# Patient Record
Sex: FEMALE
[De-identification: names, ages, dates, MRNs, and addresses within clinical notes are randomized per-mention and may not be internally consistent; named-entity substitution may affect disease eponyms.]

---

## 2021-06-29 PROBLEM — Z00.00 ENCOUNTER FOR PREVENTIVE HEALTH EXAMINATION: Status: ACTIVE | Noted: 2021-06-29

## 2021-07-06 ENCOUNTER — NON-APPOINTMENT (OUTPATIENT)
Age: 26
End: 2021-07-06

## 2021-07-06 ENCOUNTER — APPOINTMENT (OUTPATIENT)
Dept: HEART AND VASCULAR | Facility: CLINIC | Age: 26
End: 2021-07-06
Payer: MEDICAID

## 2021-07-06 VITALS
HEIGHT: 66 IN | RESPIRATION RATE: 15 BRPM | BODY MASS INDEX: 32.14 KG/M2 | DIASTOLIC BLOOD PRESSURE: 70 MMHG | WEIGHT: 200 LBS | SYSTOLIC BLOOD PRESSURE: 120 MMHG | HEART RATE: 90 BPM

## 2021-07-06 DIAGNOSIS — Z86.19 PERSONAL HISTORY OF OTHER INFECTIOUS AND PARASITIC DISEASES: ICD-10-CM

## 2021-07-06 PROCEDURE — 99214 OFFICE O/P EST MOD 30 MIN: CPT

## 2021-07-06 PROCEDURE — 93000 ELECTROCARDIOGRAM COMPLETE: CPT

## 2021-07-06 NOTE — DISCUSSION/SUMMARY
[With Me] : with me [___ Month(s)] : in [unfilled] month(s) [FreeTextEntry1] : 1. Myocarditis: EKG reviewed normal sinus rhythm nonspecific ST-T wave abnormalities. Review of hospital chart patient with a CRP of 4.68 Will repeat CRP, reviewed cardiac MRI which did not show any myocardial involvement advised that she can take NSAIDs p.r.n. continue colchicine and metoprolol for now. EKG reviewed.

## 2021-07-06 NOTE — HISTORY OF PRESENT ILLNESS
[FreeTextEntry1] : 26-year-old female with no significant past medical history comes in for followup. Patient was hospitalized on June 21 with chest pain ruled in with troponin max of 1.26 echo reviewed showed normal LV function mild valvular heart disease. Elevated CRP consistent with mild pericarditis. However, patient underwent a cardiac MRI with gadolinium which did not show any myocardial inflammation. Patient was discharged on colchicine. Subsequently she was diagnosed with C. difficile and is currently on p.o. Vanco. Overall, she feels better however does have episodes of chest discomfort and palpitations that come and go no exacerbating or relieving factors. CRP was 4.68

## 2021-07-07 LAB — CRP SERPL-MCNC: 5 MG/L

## 2021-07-15 RX ORDER — METOPROLOL SUCCINATE 25 MG/1
25 TABLET, EXTENDED RELEASE ORAL DAILY
Qty: 90 | Refills: 3 | Status: ACTIVE | COMMUNITY
Start: 1900-01-01 | End: 1900-01-01

## 2021-08-19 ENCOUNTER — APPOINTMENT (OUTPATIENT)
Dept: HEART AND VASCULAR | Facility: CLINIC | Age: 26
End: 2021-08-19
Payer: COMMERCIAL

## 2021-08-19 PROCEDURE — 99213 OFFICE O/P EST LOW 20 MIN: CPT | Mod: 95

## 2021-08-19 NOTE — DISCUSSION/SUMMARY
[FreeTextEntry1] : 1. Myocarditis: Suggest repeat CRP, continue current meds.\par 2. C. difficile: Continue vancomycin and GI followup.

## 2021-08-19 NOTE — REASON FOR VISIT
[Symptom and Test Evaluation] : symptom and test evaluation [FreeTextEntry1] : 18-year-old female with a past medical history of myopericarditis. TH visit obtained to assess. She does report that she is mostly back to baseline however she still has occasional discomfort. Last CRP was elevated. Denies any shortness of breath PND orthopnea exertional symptoms.

## 2021-09-03 ENCOUNTER — NON-APPOINTMENT (OUTPATIENT)
Age: 26
End: 2021-09-03

## 2021-09-03 ENCOUNTER — APPOINTMENT (OUTPATIENT)
Dept: HEART AND VASCULAR | Facility: CLINIC | Age: 26
End: 2021-09-03
Payer: COMMERCIAL

## 2021-09-03 VITALS
HEART RATE: 80 BPM | WEIGHT: 200 LBS | DIASTOLIC BLOOD PRESSURE: 76 MMHG | RESPIRATION RATE: 12 BRPM | HEIGHT: 66 IN | BODY MASS INDEX: 32.14 KG/M2 | SYSTOLIC BLOOD PRESSURE: 120 MMHG

## 2021-09-03 PROCEDURE — ZZZZZ: CPT

## 2021-09-03 PROCEDURE — 93000 ELECTROCARDIOGRAM COMPLETE: CPT

## 2021-09-03 PROCEDURE — 93308 TTE F-UP OR LMTD: CPT

## 2021-09-03 PROCEDURE — 93321 DOPPLER ECHO F-UP/LMTD STD: CPT

## 2021-09-03 PROCEDURE — 99214 OFFICE O/P EST MOD 30 MIN: CPT | Mod: 25

## 2021-09-03 PROCEDURE — 93325 DOPPLER ECHO COLOR FLOW MAPG: CPT

## 2021-09-03 RX ORDER — VANCOMYCIN HYDROCHLORIDE 250 MG/1
250 CAPSULE ORAL
Refills: 0 | Status: DISCONTINUED | COMMUNITY
End: 2021-09-03

## 2021-09-03 RX ORDER — DULOXETINE HYDROCHLORIDE 60 MG/1
60 CAPSULE, DELAYED RELEASE PELLETS ORAL
Qty: 30 | Refills: 0 | Status: ACTIVE | COMMUNITY
Start: 2021-07-14

## 2021-09-03 RX ORDER — ETONOGESTREL AND ETHINYL ESTRADIOL .12; .015 MG/D; MG/D
0.12-0.015 RING VAGINAL
Qty: 3 | Refills: 0 | Status: ACTIVE | COMMUNITY
Start: 2021-08-21

## 2021-09-03 RX ORDER — OMEPRAZOLE MAGNESIUM 20.6MG CAPSULES 20 MG/1
20 CAPSULE, DELAYED RELEASE ORAL
Qty: 30 | Refills: 0 | Status: ACTIVE | COMMUNITY
Start: 2021-09-03 | End: 1900-01-01

## 2021-09-03 RX ORDER — COLCHICINE 0.6 MG/1
0.6 TABLET ORAL
Qty: 30 | Refills: 0 | Status: ACTIVE | COMMUNITY
Start: 1900-01-01 | End: 1900-01-01

## 2021-09-03 RX ORDER — AZITHROMYCIN 250 MG/1
250 TABLET, FILM COATED ORAL
Qty: 6 | Refills: 0 | Status: COMPLETED | COMMUNITY
Start: 2021-07-14

## 2021-09-03 NOTE — DISCUSSION/SUMMARY
[FreeTextEntry1] : 1. Viral myocarditis: Likely resolved, chest pain at this point more likely to be secondary to GERD. Advised to start PPI Prilosec over-the-counter 20 mg daily half hour before eating or drinking in the morning. Continue colchicine for now if chest pain syndrome improved with the addition of PPI advised to taper off colchicine and continue PPI. EKG.\par 2. Palpitations: If after treatment of chest discomfort symptoms persist will place a 24-hour Holter monitor to assess for arrhythmia\par 3. Shortness of breath: Will repeat followup echo to assess for pericardial effusion LV function.

## 2021-09-03 NOTE — HISTORY OF PRESENT ILLNESS
[FreeTextEntry1] : 26-year-old female with a past medical history of myopericarditis comes in for followup. Since last visit patient underwent repeat CRP which is normalized. however patient continues to have occasional shortness of breath and palpitations as well as substernal chest discomfort worse when lying down better when sitting up. Overall, she reports feeling markedly improved however still suffers from these effects of her previous illness.

## 2021-10-14 ENCOUNTER — APPOINTMENT (OUTPATIENT)
Dept: HEART AND VASCULAR | Facility: CLINIC | Age: 26
End: 2021-10-14

## 2021-10-18 ENCOUNTER — NON-APPOINTMENT (OUTPATIENT)
Age: 26
End: 2021-10-18

## 2021-10-18 ENCOUNTER — APPOINTMENT (OUTPATIENT)
Dept: HEART AND VASCULAR | Facility: CLINIC | Age: 26
End: 2021-10-18
Payer: MEDICAID

## 2021-10-18 VITALS
HEART RATE: 78 BPM | WEIGHT: 202 LBS | HEIGHT: 66 IN | BODY MASS INDEX: 32.47 KG/M2 | DIASTOLIC BLOOD PRESSURE: 80 MMHG | SYSTOLIC BLOOD PRESSURE: 120 MMHG | RESPIRATION RATE: 12 BRPM

## 2021-10-18 DIAGNOSIS — R00.2 PALPITATIONS: ICD-10-CM

## 2021-10-18 PROCEDURE — 99214 OFFICE O/P EST MOD 30 MIN: CPT

## 2021-10-18 PROCEDURE — 93000 ELECTROCARDIOGRAM COMPLETE: CPT

## 2021-10-18 RX ORDER — CEFADROXIL 500 MG/1
500 CAPSULE ORAL
Qty: 20 | Refills: 0 | Status: DISCONTINUED | COMMUNITY
Start: 2021-09-19

## 2021-10-18 NOTE — DISCUSSION/SUMMARY
[FreeTextEntry1] : 1. Shortness of breath: Echo recently done was within normal limits. Will obtain an EKG and a ETT and advise wants results are known.\par 2. Palpitations: If symptoms persist will place a 24-hour Holter monitor rate\par 3. Chest discomfort: Advised either to start PPI which she was previously on and reports was helping or start H2 blocker such as Pepcid.

## 2021-10-18 NOTE — HISTORY OF PRESENT ILLNESS
[FreeTextEntry1] : 18-year-old female with a past medical history of mild pericarditis comes in for followup after recent hospitalization for chest discomfort and palpitations as well as shortness of breath. Patient presented to [facility] with complaints of palpitations and exertional shortness of breath. Hospital record reviewed blood work and EKG within normal limits.

## 2021-10-18 NOTE — PHYSICAL EXAM

## 2021-10-22 ENCOUNTER — APPOINTMENT (OUTPATIENT)
Dept: HEART AND VASCULAR | Facility: CLINIC | Age: 26
End: 2021-10-22
Payer: MEDICAID

## 2021-10-22 DIAGNOSIS — Z86.79 PERSONAL HISTORY OF OTHER DISEASES OF THE CIRCULATORY SYSTEM: ICD-10-CM

## 2021-10-22 DIAGNOSIS — R06.02 SHORTNESS OF BREATH: ICD-10-CM

## 2021-10-22 PROCEDURE — 93015 CV STRESS TEST SUPVJ I&R: CPT
